# Patient Record
Sex: FEMALE | Race: AMERICAN INDIAN OR ALASKA NATIVE
[De-identification: names, ages, dates, MRNs, and addresses within clinical notes are randomized per-mention and may not be internally consistent; named-entity substitution may affect disease eponyms.]

---

## 2019-09-08 ENCOUNTER — HOSPITAL ENCOUNTER (EMERGENCY)
Dept: HOSPITAL 5 - ED | Age: 49
Discharge: HOME | End: 2019-09-08
Payer: MEDICARE

## 2019-09-08 VITALS — SYSTOLIC BLOOD PRESSURE: 148 MMHG | DIASTOLIC BLOOD PRESSURE: 88 MMHG

## 2019-09-08 DIAGNOSIS — F43.10: ICD-10-CM

## 2019-09-08 DIAGNOSIS — F32.9: ICD-10-CM

## 2019-09-08 DIAGNOSIS — F41.9: Primary | ICD-10-CM

## 2019-09-08 DIAGNOSIS — Z98.51: ICD-10-CM

## 2019-09-08 PROCEDURE — 93010 ELECTROCARDIOGRAM REPORT: CPT

## 2019-09-08 PROCEDURE — 93005 ELECTROCARDIOGRAM TRACING: CPT

## 2019-09-08 NOTE — EVENT NOTE
ED Screening Note


Date of service: 09/08/19


Time: 12:04


ED Screening Note: 


49  y o F with PMH of anxiety and depresion  presents stating shes having an 

anxiety attack 


currently on zoloft





This initial assessment/diagnostic orders/clinical plan/treatment(s) is/are 

subject to change based on patients health status, clinical progression and re-

assessment by fellow clinical providers in the ED. Further treatment and workup 

at subsequent clinical providers discretion. Patient/guardian urged not to elope

from the ED as their condition may be serious if not clinically assessed and 

managed. 





Initial orders include:

## 2019-09-08 NOTE — EMERGENCY DEPARTMENT REPORT
ED General Adult HPI





- General


Chief complaint: Anxiety


Stated complaint: BLOOD PRESSURE CHECK


Time Seen by Provider: 09/08/19 12:03


Source: patient


Mode of arrival: Ambulatory


Limitations: No Limitations





- History of Present Illness


Initial comments: 





Patient is a 49-year-old  female with a past medical history of 

anxiety PTSD and depression who is presenting with palpitations and chest 

discomfort.  Patient feels that she is rather nervous may be having a panic 

attack.  Patient taking Zoloft for anxiety type symptoms states this is not 

helping.  When asked if the patient having any life stressors patient responded 

that her son is living with her now and he is getting on her nerves.  Patient 

denies any diaphoresis or shortness of breath with exertion nausea vomiting 

diarrhea cough cold or congestion at this time.





- Related Data


                                  Previous Rx's











 Medication  Instructions  Recorded  Last Taken  Type


 


hydrOXYzine PAMOATE [Vistaril] 25 mg PO Q6HR PRN #20 capsule 09/08/19 Unknown Rx











                                    Allergies











Allergy/AdvReac Type Severity Reaction Status Date / Time


 


No Known Allergies Allergy   Unverified 09/08/19 11:57














ED Review of Systems


ROS: 


Stated complaint: BLOOD PRESSURE CHECK


Other details as noted in HPI





Comment: All other systems reviewed and negative





ED Past Medical Hx





- Past Medical History


Previous Medical History?: Yes


Hx Psychiatric Treatment: Yes (Anxiety, PTSD, depression)


Additional medical history: chronic  back pain





- Surgical History


Past Surgical History?: Yes


Additional Surgical History: tubiligation





- Social History


Smoking Status: Never Smoker


Substance Use Type: None





- Medications


Home Medications: 


                                Home Medications











 Medication  Instructions  Recorded  Confirmed  Last Taken  Type


 


hydrOXYzine PAMOATE [Vistaril] 25 mg PO Q6HR PRN #20 capsule 09/08/19  Unknown 

Rx














ED Physical Exam





- General


Limitations: No Limitations


General appearance: alert, in no apparent distress





- Head


Head exam: Present: atraumatic, normocephalic





- Eye


Eye exam: Present: normal appearance





- ENT


ENT exam: Present: mucous membranes moist





- Neck


Neck exam: Present: normal inspection





- Respiratory


Respiratory exam: Present: normal lung sounds bilaterally.  Absent: respiratory 

distress, wheezes, rales, rhonchi





- Cardiovascular


Cardiovascular Exam: Present: regular rate, normal rhythm.  Absent: systolic 

murmur, diastolic murmur, rubs, gallop





- GI/Abdominal


GI/Abdominal exam: Present: soft, normal bowel sounds.  Absent: distended, 

tenderness, guarding, rebound





- Extremities Exam


Extremities exam: Present: normal inspection





- Back Exam


Back exam: Present: normal inspection





- Neurological Exam


Neurological exam: Present: alert, oriented X3





- Psychiatric


Psychiatric exam: Present: normal affect, normal mood





- Skin


Skin exam: Present: warm, dry, intact, normal color.  Absent: rash





ED Course





                                   Vital Signs











  09/08/19





  12:02


 


Temperature 98.2 F


 


Pulse Rate 90


 


Respiratory 20





Rate 


 


Blood Pressure 148/88


 


O2 Sat by Pulse 96





Oximetry 














ED Medical Decision Making





- EKG Data


-: EKG Interpreted by Me


EKG shows normal: sinus rhythm, axis, intervals, QRS complexes, ST-T waves


Rate: normal





- EKG Data


Interpretation: normal EKG





- Medical Decision Making





EKG was performed to rule out ischemic changes pericarditis.  EKG is within 

normal limits.  Patient started on Vistaril in addition to her Zoloft and 

patient be discharged home.


Critical care attestation.: 


If time is entered above; I have spent that time in minutes in the direct care 

of this critically ill patient, excluding procedure time.








ED Disposition


Clinical Impression: 


 Anxiety reaction





Disposition: DC-01 TO HOME OR SELFCARE


Is pt being admited?: No


Does the pt Need Aspirin: No


Condition: Stable


Instructions:  Anxiety (ED)


Prescriptions: 


hydrOXYzine PAMOATE [Vistaril] 25 mg PO Q6HR PRN #20 capsule


 PRN Reason: Anxiety


Referrals: 


[facility], [place] [Referring] - 3-5 Days


Time of Disposition: 12:32